# Patient Record
Sex: MALE | Race: WHITE | NOT HISPANIC OR LATINO | URBAN - METROPOLITAN AREA
[De-identification: names, ages, dates, MRNs, and addresses within clinical notes are randomized per-mention and may not be internally consistent; named-entity substitution may affect disease eponyms.]

---

## 2020-10-22 ENCOUNTER — NEW PATIENT COMPREHENSIVE (OUTPATIENT)
Dept: URBAN - METROPOLITAN AREA CLINIC 47 | Facility: CLINIC | Age: 60
End: 2020-10-22

## 2020-10-22 DIAGNOSIS — H25.13: ICD-10-CM

## 2020-10-22 DIAGNOSIS — E11.9: ICD-10-CM

## 2020-10-22 DIAGNOSIS — H52.7: ICD-10-CM

## 2020-10-22 PROCEDURE — 92015 DETERMINE REFRACTIVE STATE: CPT

## 2020-10-22 PROCEDURE — 92004 COMPRE OPH EXAM NEW PT 1/>: CPT

## 2020-10-22 ASSESSMENT — VISUAL ACUITY
OD_SC: >J12
OD_CC: J3
OS_CC: 20/25
OS_SC: >J12
OD_SC: 20/200
OS_CC: J3
OS_SC: 20/200
OD_CC: 20/25-1

## 2020-10-22 ASSESSMENT — TONOMETRY
OS_IOP_MMHG: 18
OD_IOP_MMHG: 17

## 2021-02-12 NOTE — PATIENT DISCUSSION
Eyelid Ptosis Counseling: I have discussed my exam findings and the nature of the diagnosis to the patient. 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 I have explained the findings and the pathophysiology of a third nerve palsy to the patient and answered all questions. I have explained that the patient needs an emergent neurological consultation. I recommended patient return to his primary care physician for full blood workup to rule out systemic condition. The patient is instructed to return for follow-up as scheduled or sooner if any change in symptoms.

## 2021-02-12 NOTE — PATIENT DISCUSSION
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 SEVERE &amp; VERY SYMPTOMATIC. PT HAS BEEN HAVING HEADACHES &amp; DIPLOPIA. RETURN TO DR. Mariah Fuentes FOR FULL BLOOD WORKUP. PT NEEDS AN EMERGENT NEUROLOGICAL REFERRAL FROM PCP; RECOMMEND BRAIN MRI WITH CONTRAST. RETURN IN 10 DAYS WILL CONSIDER RETINAL CONSULTATION.

## 2021-02-22 NOTE — PATIENT DISCUSSION
RESOLVING LEFT CRANIAL NERVE PALSY: MRI &amp; MRA WNL PER PT. PT STATES THAT HEADACHES HAVE SUBSIDED. PTS EYE IS SLOWLY OPENING. IMPROVEMENT ON TODAYS ASSESSMENT. NO NEED FOR UPDDATED GL RX. RETURN IN 3 WEEKS. RECOMMENDED PT STAY OUT OF CL FOR ANOTHER WEEK.

## 2021-03-22 NOTE — PATIENT DISCUSSION
RECENTLY DIAGNOSED WITH MYASTHENIA GRAVIS. PTOSIS COMPLETELY RESOLVED AS WELL AS NORMAL OCULAR MOTILITY. CONTINUE TO FOLLOW UP WITH NEUROLOGY. FOLLOW UP IN 6 MONTHS FOR IOP CK.

## 2021-12-06 NOTE — PATIENT DISCUSSION
Pt was diagnosed with myasthenia gravis. Mild recurrent ptosis. No cranial nerve involvement. Good eye alignment. Continue with neurologist as scheduled.

## 2022-11-09 ENCOUNTER — COMPREHENSIVE EXAM (OUTPATIENT)
Dept: URBAN - METROPOLITAN AREA CLINIC 47 | Facility: CLINIC | Age: 62
End: 2022-11-09

## 2022-11-09 DIAGNOSIS — E11.9: ICD-10-CM

## 2022-11-09 DIAGNOSIS — H52.7: ICD-10-CM

## 2022-11-09 DIAGNOSIS — H25.13: ICD-10-CM

## 2022-11-09 PROCEDURE — 92014 COMPRE OPH EXAM EST PT 1/>: CPT

## 2022-11-09 PROCEDURE — 92015 DETERMINE REFRACTIVE STATE: CPT

## 2022-11-09 ASSESSMENT — TONOMETRY
OD_IOP_MMHG: 19
OS_IOP_MMHG: 18

## 2022-11-09 ASSESSMENT — VISUAL ACUITY
OS_CC: 20/20
OD_CC: 20/20-1

## 2024-01-04 ENCOUNTER — COMPREHENSIVE EXAM (OUTPATIENT)
Dept: URBAN - METROPOLITAN AREA CLINIC 47 | Facility: CLINIC | Age: 64
End: 2024-01-04

## 2024-01-04 DIAGNOSIS — E11.9: ICD-10-CM

## 2024-01-04 DIAGNOSIS — H52.7: ICD-10-CM

## 2024-01-04 DIAGNOSIS — H25.13: ICD-10-CM

## 2024-01-04 PROCEDURE — 92014 COMPRE OPH EXAM EST PT 1/>: CPT

## 2024-01-04 PROCEDURE — 92015 DETERMINE REFRACTIVE STATE: CPT

## 2024-01-04 ASSESSMENT — VISUAL ACUITY
OS_BAT: 20/30
OD_BAT: 20/40-2
OS_CC: 20/25
OS_CC: J3
OD_CC: J6
OD_CC: 20/40

## 2024-01-04 ASSESSMENT — TONOMETRY
OD_IOP_MMHG: 19
OS_IOP_MMHG: 20

## 2025-08-15 ENCOUNTER — COMPREHENSIVE EXAM (OUTPATIENT)
Age: 65
End: 2025-08-15

## 2025-08-15 DIAGNOSIS — E11.9: ICD-10-CM

## 2025-08-15 DIAGNOSIS — H52.7: ICD-10-CM

## 2025-08-15 DIAGNOSIS — H25.813: ICD-10-CM

## 2025-08-15 DIAGNOSIS — H04.123: ICD-10-CM

## 2025-08-15 PROCEDURE — 92015 DETERMINE REFRACTIVE STATE: CPT

## 2025-08-15 PROCEDURE — 92014 COMPRE OPH EXAM EST PT 1/>: CPT
